# Patient Record
Sex: MALE | Race: WHITE | NOT HISPANIC OR LATINO | URBAN - METROPOLITAN AREA
[De-identification: names, ages, dates, MRNs, and addresses within clinical notes are randomized per-mention and may not be internally consistent; named-entity substitution may affect disease eponyms.]

---

## 2018-11-03 ENCOUNTER — EMERGENCY (EMERGENCY)
Facility: HOSPITAL | Age: 43
LOS: 1 days | Discharge: HOME | End: 2018-11-03
Attending: EMERGENCY MEDICINE | Admitting: EMERGENCY MEDICINE
Payer: COMMERCIAL

## 2018-11-03 VITALS
SYSTOLIC BLOOD PRESSURE: 115 MMHG | RESPIRATION RATE: 18 BRPM | HEART RATE: 85 BPM | TEMPERATURE: 98 F | DIASTOLIC BLOOD PRESSURE: 77 MMHG | OXYGEN SATURATION: 97 %

## 2018-11-03 VITALS
HEART RATE: 83 BPM | OXYGEN SATURATION: 99 % | RESPIRATION RATE: 18 BRPM | SYSTOLIC BLOOD PRESSURE: 149 MMHG | TEMPERATURE: 98 F | DIASTOLIC BLOOD PRESSURE: 88 MMHG

## 2018-11-03 LAB
D DIMER BLD IA.RAPID-MCNC: <187 NG/ML DDU — SIGNIFICANT CHANGE UP
INR BLD: 1.1 — SIGNIFICANT CHANGE UP (ref 0.88–1.16)
PROTHROM AB SERPL-ACNC: 12.1 SEC — SIGNIFICANT CHANGE UP (ref 10–12.9)

## 2018-11-03 PROCEDURE — 99285 EMERGENCY DEPT VISIT HI MDM: CPT | Mod: 25

## 2018-11-03 PROCEDURE — 71046 X-RAY EXAM CHEST 2 VIEWS: CPT | Mod: 26

## 2018-11-03 PROCEDURE — 93010 ELECTROCARDIOGRAM REPORT: CPT | Mod: NC

## 2018-11-03 RX ORDER — INDOMETHACIN 50 MG
1 CAPSULE ORAL
Qty: 21 | Refills: 0 | OUTPATIENT
Start: 2018-11-03 | End: 2018-11-09

## 2018-11-03 RX ORDER — METHOCARBAMOL 500 MG/1
2 TABLET, FILM COATED ORAL
Qty: 30 | Refills: 0 | OUTPATIENT
Start: 2018-11-03 | End: 2018-11-07

## 2018-11-03 NOTE — ED PROVIDER NOTE - OBJECTIVE STATEMENT
42 yo male no pmhx to ED c/o 2 day hx of intermittent sharp left sided cp worse when lifting or movement. Denies palpitations/soa/syncope/hemoptysis. Pt states he flies from [facility] to [facility] twice monthly, denies leg pain/swelling.  No family hx of blood dyscrasia.  Pt had another episode this am, took aspirin and came to ED.

## 2018-11-03 NOTE — ED PROVIDER NOTE - DIAGNOSTIC INTERPRETATION
Interpreted by MD  chest_ x-ray, 2_ views  Lungs clear, heart shadow normal, bony structures normal, no free air under diaphragm, no PTX

## 2018-11-03 NOTE — ED ADULT TRIAGE NOTE - CHIEF COMPLAINT QUOTE
Pt. c/o left sided chest pain and tightness for 2 days, today pain got worse and traveled to left scapula. Pt. reports today after lifting his son diamond pain worsened and then he began feeling a numbness and tingling to the left side of his body. Pt. took 650mg of Aspirin prior to arrival.

## 2018-11-03 NOTE — ED ADULT NURSE NOTE - OBJECTIVE STATEMENT
c/o chest pain for 3 days worse with lifting heavy objects today he states he became very dizzy and felt tingling on body, took 2 ASA

## 2018-11-03 NOTE — ED ADULT NURSE NOTE - NSIMPLEMENTINTERV_GEN_ALL_ED
Implemented All Universal Safety Interventions:  Sellers to call system. Call bell, personal items and telephone within reach. Instruct patient to call for assistance. Room bathroom lighting operational. Non-slip footwear when patient is off stretcher. Physically safe environment: no spills, clutter or unnecessary equipment. Stretcher in lowest position, wheels locked, appropriate side rails in place.

## 2018-11-06 DIAGNOSIS — M54.9 DORSALGIA, UNSPECIFIED: ICD-10-CM

## 2018-11-06 DIAGNOSIS — Z88.1 ALLERGY STATUS TO OTHER ANTIBIOTIC AGENTS STATUS: ICD-10-CM

## 2018-11-06 DIAGNOSIS — R07.89 OTHER CHEST PAIN: ICD-10-CM

## 2018-11-06 DIAGNOSIS — M54.2 CERVICALGIA: ICD-10-CM
